# Patient Record
Sex: FEMALE
[De-identification: names, ages, dates, MRNs, and addresses within clinical notes are randomized per-mention and may not be internally consistent; named-entity substitution may affect disease eponyms.]

---

## 2017-09-17 ENCOUNTER — HOSPITAL ENCOUNTER (EMERGENCY)
Dept: HOSPITAL 25 - UCEAST | Age: 32
Discharge: HOME | End: 2017-09-17
Payer: COMMERCIAL

## 2017-09-17 VITALS — DIASTOLIC BLOOD PRESSURE: 85 MMHG | SYSTOLIC BLOOD PRESSURE: 109 MMHG

## 2017-09-17 DIAGNOSIS — O21.9: Primary | ICD-10-CM

## 2017-09-17 DIAGNOSIS — Z3A.01: ICD-10-CM

## 2017-09-17 PROCEDURE — 84702 CHORIONIC GONADOTROPIN TEST: CPT

## 2017-09-17 PROCEDURE — G0463 HOSPITAL OUTPT CLINIC VISIT: HCPCS

## 2017-09-17 PROCEDURE — 96360 HYDRATION IV INFUSION INIT: CPT

## 2017-09-17 PROCEDURE — 99202 OFFICE O/P NEW SF 15 MIN: CPT

## 2017-09-17 PROCEDURE — 81003 URINALYSIS AUTO W/O SCOPE: CPT

## 2017-09-17 NOTE — UC
Pregnancy





- HPI Summary


HPI Summary: 





31 y/o female  presents to the urgent care c/o persistent nausea and 

vomiting aince 9/10/2017. Pt states she is 7 weeks pregnant LMP 2017.  

Every day she has had 2 episode of vomiting. However yesterday she lost count. 

Today 3 episodes in the morning. She reports Hx of cyclic vomiting syndrome , 

Dx last years by VAUGHN RICCI who Rx Zolof and which helped with her syndrome. she has 

her first OBGYN appt on 10/5/2017.  She is not longer taking the Zolof since it 

was tapered down when she found out she was pregnant. She took 2 Zofrans this 

week, her PCP DR Gladys Nielsen told her it was ok to take it. she was able to 

eat some soup, and beef and drinking Pedialyte  yesterday  Pt denies abdominal 

pain, pelvic pain, vaginal discharge, vaginal bleeding,fever, SOB, chest pain, 

Diarrhea or constipation, urinary symptoms, dizziness. 








- History of Current Complaint


Chief Complaint: UCGI


Stated Complaint: VOMITING PREGNANT


Time Seen by Provider: 17 12:05


Hx Obtained From: Patient


Chief Complaint: Other: - Nausea and vomiting


Onset/Duration: Started Weeks Ago - 1 week ago, Still Present


Timing: Intermittent - 3 every day, exept yesterday where she lost count, 

Lasting Seconds, Lasting Days


Severity: Mild


Current Severity: Moderate


Pain Intensity: 0


Location of Pain: None


Aggravating Factors: Other: - environmental odors and smoking odors


Alleviating Factors: Nothing


Associated Signs and Symptoms: Positive: Appetite - decrease, Nausea, Vomiting





- Pregnancy Assessment


Hx Pregnant Now: Yes


Expected Date of Delivery: 18


Hx : 1


Hx Para: 0


History of Ectopic Pregnancy: No


Hx Pelvic Inflammatory Disease: No





- Risk Factors


Ectopic Pregnancy Risk Factor: Negative


Ovarian Torsion Risk Factor: Negative





- Allergies/Home Medications


Allergies/Adverse Reactions: 


 Allergies











Allergy/AdvReac Type Severity Reaction Status Date / Time


 


No Known Allergies Allergy   Verified 17 11:50











Home Medications: 


 Home Medications





Docosahexaenoic Acid [Prenatal Dha] 1 cap PO DAILY 17 [History Confirmed 

17]











PMH/Surg Hx/FS Hx/Imm Hx


Previously Healthy: Yes


Endocrine/Hematology History: 


   Denies: Hx Diabetes, Hx Thyroid Disease


Cardiovascular History: 


   Denies: Hx Hypertension


Respiratory History: 


   Denies: Hx Asthma, Hx Chronic Obstructive Pulmonary Disease (COPD)


GI History: Reports: Other GI Disorders - Cyclic vomitng syndrome


   Denies: Hx Ulcer


Infectious Disease History: No


Infectious Disease History: 


   Denies: Hx Clostridium Difficile, Hx Hepatitis, Hx Human Immunodeficiency 

Virus (HIV), Hx of Known/Suspected MRSA, Hx Shingles, Hx Tuberculosis, Hx Known/

Suspected VRE, Hx Known/Suspected VRSA, History Other Infectious Disease, 

Traveled Outside the [place] in Last 30 Days





- Family History


Known Family History: Positive: Cardiac Disease, Hypertension





- Social History


Occupation: Unemployed


Lives: With Family


Alcohol Use: None


Substance Use Type: Reports: None


Smoking Status (MU): Never Smoked Tobacco





Review of Systems


Constitutional: Negative


Skin: Negative


Eyes: Negative


ENT: Negative


Respiratory: Negative


Cardiovascular: Negative


Gastrointestinal: Vomiting, Nausea


Genitourinary: Negative


Motor: Negative


Neurovascular: Negative


Musculoskeletal: Negative


Neurological: Negative


Psychological: Negative


Is Patient Immunocompromised?: No


All Other Systems Reviewed And Are Negative: Yes





Physical Exam





- Physical  Exam


Triage Information Reviewed: Yes


Vital Signs Reviewed: Yes


Appearance: Positive: Well-Appearing, No Pain Distress, Well-Nourished


Skin: Positive: Warm, Skin Color Reflects Adequate Perfusion, Dry, Other - mild 

dry oral mucosa


Head/Face: Positive: Normal Head/Face Inspection


Eyes: Positive: Normal, EOMI, GLORIA, Conjunctiva Clear


ENT: Positive: Normal ENT inspection, Hearing grossly normal, Pharynx normal, 

TMs normal


Neck: Positive: Supple, Nontender, No Lymphadenopathy


Respiratory/Lung Sounds: Positive: Clear to Auscultation, Breath Sounds Present


Cardiovascular: Positive: Normal, RRR, Pulses are Symmetrical in both Upper and 

Lower Extremities, S1, S2


Abdomen Description: Positive: Nontender, No Organomegaly, Soft.  Negative: CVA 

Tenderness (R), CVA Tenderness (L)


Bowel Sounds: Positive: Present


Musculoskeletal: Positive: Normal, Strength/ROM Intact


Neurological: Positive: Normal, Sensory/Motor Intact, Alert, Oriented to Person 

Place, Time, CN Intact II-III, Reflexes Intact


Psychiatric: Positive: Normal





Pregnancy Course/Dx





- Course


Course Of Treatment: 31 y/o female  presents to the urgent care c/o 

persistent nausea and vomiting aince 9/10/2017. Pt states she is 7 weeks 

pregnant LMP 2017.  Every day she has had 2 episode of vomiting. However 

yesterday she lost count. Today 3 episodes in the morning. She reports Hx of 

cyclic vomiting syndrome , Dx last years by GI DR who Rx Zolof and which helped 

with her syndrome. she has her first OBGYN appt on 10/5/2017.  She is not 

longer taking the Zolof since it was tapered down when she found out she was 

pregnant. She took 2 Zofrans this week, her PCP DR Gladys Nielsen told her it was 

ok to take it. she was able to eat some soup, and beef and drinking Pedialyte  

yesterday  Pt denies abdominal pain, pelvic pain, vaginal discharge, vaginal 

bleeding,fever, SOB, chest pain, Diarrhea or constipation, urinary symptoms, 

dizziness. HX obtained.  UA: +ketones, +protein, +intact trace blood and 

pregnancy test: positive. PE WNL except pt with mild dehydration. IV fluid 

ordered , 1 bag. Pt tolerated well IV fluids and felt better. Pt Rx Diclgis for 

Nausea and vomiting and advised to stop taking Zofran. Pt educated on her diet 

to avoid vomiting triggers. Advised to f/u with OBGYN Dr Ahuja in 2-3 days 

for further management. Pt understood and agreed with plan of care and left the 

clinic ambulating and A&OX3





- Pregnancy


Differential Diagnosis/HQI/PQRI: Hyperemesis Gravidarum, Early Pregnancy, UTI, 

Vaginal Bleeding





- Diagnoses


Provider Diagnoses: 


 Nausea and vomiting during pregnancy prior to 22 weeks gestation, Pregnancy








Discharge





- Discharge Plan


Condition: Stable


Disposition: HOME


Prescriptions: 


Doxylamine/Pyridoxine(NF) [Diclegis (NF)] 1 tab PO QPM #28 tab MDD 4 tabs/day


Patient Education Materials:  Hyperemesis Gravidarum (ED)


Referrals: 


[facility] [Primary Care Provider] - 


Wanda Evans MD [Medical Doctor] - 1 Week


Additional Instructions: 


1- Please take the Diclegis as directed to alleviate symptoms of Nausea and 

Vomiting.


2- Please f/u with OBGYN Dr Evans for further management in your pregnancy


3- If you develop abdominal pain, pelvic pain or vaginal bleeding, please go 

immediately to the ER for further management.





Diet


Meals and snacks  Women with nausea should eat before, or as soon as, they 

feel hungry to avoid an empty stomach, which can aggravate nausea [1]. A snack 

before getting out of bed in the morning and snacks during the night (eg, 

crackers with peanut butter or cheese taken prior to bathroom trips) may be 

helpful.


Meals and snacks should be eaten slowly and in small amounts every one to two 

hours to avoid a full stomach, which can also aggravate nausea [2].


Women should determine what foods they tolerate best and try to eat those 

foods. Dietary manipulations that help some women include eliminating coffee 

and spicy, odorous, high-fat, acidic, and very sweet foods, and substituting 

snacks/meals that are protein-dominant, salty, low-fat, bland, and/or dry (eg, 

nuts, pretzels, crackers, cereal, toast) [1-3]. Drinking peppermint tea or 

sucking peppermint candies may reduce postprandial nausea [4]. 


However, high-quality evidence of the optimal dietary components to reduce 

nausea are sparse. Although clinicians commonly recommend ingestion of frequent

, small, carbohydrate-predominant meals/snacks, such as soda crackers or dry 

toast, based primarily on anecdotal evidence passed down over a century [5], 

consumption of protein-predominant meals/snacks may be more helpful and was 

associated with quantifiable decreases in nausea in one study [6].


Women whose symptoms are related to delayed gastric emptying should improve 

with a diet consisting of low-fat solids and liquids since these foods are more 

readily emptied by the stomach; however, it is not known to what degree gastric 

emptying and dysfunction account for symptoms in women with nausea and vomiting 

of pregnancy.


Fluids  Fluids should be consumed at least 30 minutes before or after solid 

food to minimize the effect of a full stomach [2]. Fluids are better tolerated 

if cold, clear, and carbonated or sour (eg, ginger ale, lemonade, popsicles) 

and taken in small amounts; using a straw sometimes helps [7]. Some women find 

aromatic liquids, such as lemon or mint tea, more tolerable. Small volumes of 

electrolyte-replacement sports drinks can be used to replace both fluids and 

electrolytes, if tolerated.


Avoidance of triggers  Along with dietary changes, avoidance of environmental 

triggers is a key intervention for reducing nausea and vomiting of pregnancy [3]

. Examples of some triggers include stuffy rooms, odors (eg, perfume, chemicals

, food, smoke) [8], heat, humidity, noise, and visual or physical motion (eg, 

flickering lights, driving) [9]. Quickly changing position and not getting 

enough rest, particularly after eating, may also aggravate symptoms [10]. Lying 

down soon after eating and lying on the left side are additional potentially 

aggravating factors because these actions may delay gastric emptying [2]. Cold 

solid foods are tolerated better than hot solid foods because they have less 

odor and require less preparation time (ie, shorter exposure to the trigger if 

the woman is preparing her own meal) [2]. Brushing teeth after a meal [4], 

spitting out saliva, and frequently washing out the mouth can also be helpful.


Supplements containing iron should be avoided until symptoms resolve, as iron 

causes gastric irritation and can provoke nausea and vomiting [11]. Taking 

prenatal vitamins before bed with a snack, instead of in the morning or on an 

empty stomach, may also be helpful [12].


Ginger  We suggest use of ginger-containing foods (eg, ginger lollipops, 

ginger tea, foods made with ginger root or syrup) for women with nausea. We do 

not prescribe powdered ginger because standard pharmacologic-grade ginger 

preparations are not readily available [13]; however, if prescribed, a common 

dose is 1 to 1.5 g orally divided over 24 hours (eg, 250 mg ginger capsules 

orally four times a day). In a 2014 systematic review and meta-analysis of 12 

randomized trials (n = 1278 pregnant women), ginger improved nausea compared 

with placebo but did not significantly reduce vomiting [14].

## 2018-05-03 ENCOUNTER — HOSPITAL ENCOUNTER (INPATIENT)
Dept: HOSPITAL 25 - MCHOBOUT | Age: 33
LOS: 2 days | Discharge: HOME | DRG: 560 | End: 2018-05-05
Attending: MIDWIFE | Admitting: MIDWIFE
Payer: COMMERCIAL

## 2018-05-03 DIAGNOSIS — O48.0: Primary | ICD-10-CM

## 2018-05-03 DIAGNOSIS — O92.13: ICD-10-CM

## 2018-05-03 DIAGNOSIS — Z3A.40: ICD-10-CM

## 2018-05-03 LAB
BASOPHILS # BLD AUTO: 0.1 10^3/UL (ref 0–0.2)
EOSINOPHIL # BLD AUTO: 0 10^3/UL (ref 0–0.6)
HCT VFR BLD AUTO: 40 % (ref 35–47)
HGB BLD-MCNC: 13.7 G/DL (ref 12–16)
LYMPHOCYTES # BLD AUTO: 2.1 10^3/UL (ref 1–4.8)
MCH RBC QN AUTO: 30 PG (ref 27–31)
MCHC RBC AUTO-ENTMCNC: 34 G/DL (ref 31–36)
MCV RBC AUTO: 88 FL (ref 80–97)
MONOCYTES # BLD AUTO: 0.6 10^3/UL (ref 0–0.8)
NEUTROPHILS # BLD AUTO: 6.9 10^3/UL (ref 1.5–7.7)
NRBC # BLD AUTO: 0 10^3/UL
NRBC BLD QL AUTO: 0.1
PLATELET # BLD AUTO: 167 10^3/UL (ref 150–450)
RBC # BLD AUTO: 4.57 10^6/UL (ref 4–5.4)
WBC # BLD AUTO: 9.6 10^3/UL (ref 3.5–10.8)

## 2018-05-03 PROCEDURE — 85025 COMPLETE CBC W/AUTO DIFF WBC: CPT

## 2018-05-03 PROCEDURE — 86850 RBC ANTIBODY SCREEN: CPT

## 2018-05-03 PROCEDURE — 36415 COLL VENOUS BLD VENIPUNCTURE: CPT

## 2018-05-03 PROCEDURE — 4A1HXCZ MONITORING OF PRODUCTS OF CONCEPTION, CARDIAC RATE, EXTERNAL APPROACH: ICD-10-PCS | Performed by: MIDWIFE

## 2018-05-03 PROCEDURE — 85027 COMPLETE CBC AUTOMATED: CPT

## 2018-05-03 PROCEDURE — 10907ZC DRAINAGE OF AMNIOTIC FLUID, THERAPEUTIC FROM PRODUCTS OF CONCEPTION, VIA NATURAL OR ARTIFICIAL OPENING: ICD-10-PCS | Performed by: MIDWIFE

## 2018-05-03 PROCEDURE — 86901 BLOOD TYPING SEROLOGIC RH(D): CPT

## 2018-05-03 PROCEDURE — 3E033VJ INTRODUCTION OF OTHER HORMONE INTO PERIPHERAL VEIN, PERCUTANEOUS APPROACH: ICD-10-PCS | Performed by: MIDWIFE

## 2018-05-03 PROCEDURE — 86900 BLOOD TYPING SEROLOGIC ABO: CPT

## 2018-05-03 NOTE — PN
L&D Outpatient: Visit





- Reproductive Information


Estimated Due Date: 18


Gestational Age: 40 Weeks and 0 Days


: 1





- Reason for Visit


Visit Reason: 





Patient arrived to L & D in early/prodromal labor. Minimal sleep overnight, 

aldair every 6-12 minutes. Notes strength varies with contractions. No 

vaginal bleeding or fluid loss. 





- Antepartal Records


Antepartal Record: Reviewed, Pregnancy Complicated by: - Gestational diabetes





- Patient History


Patient History Significant For: 





Hx of depression/anxiety; weaned off of sertraline at start of pregnancy





L&D Outpatient: ROS





- Review of Systems


Constitutional: Uncomfortable - Mild discomfort with contractions


CV Complaint: No


Respiratory: Shortness of Breath: No


Gastrointestinal: No Nausea/Vomiting, Normal Bowel Movement


Genitourinary: No Dysuria, No Bleeding, No Leaking Fluid


Musculoskeletal: Contractions


Fetal Movement: Normal





L&D Outpatient: Exam


Vitals - Most Recent: 





T 97.4 /85 P 100 R 18





- Cervical Exam


Cervical Exam: 





2-3/90/0





- Abdominal Exam


Abdomen Exam: Non-Tender, Fundal Height Consistent with Dates





- Membranes


Membrane Status: Intact





- Ultrasound/Biophysical Profile


Ultrasound Status: Not Done





L&D Outpatient: EFM





- External Fetal Monitor Findings


Baseline Fetal Heart Rate: 140


External Fetal Monitor Findings: Accelerations Present, No Pattern of Variable 

or Late Decelerations, Variability Moderate, Baseline Stable





L&D Outpatient: Asses/Plan


Assessment: 





31 yo G1 at 40 0/7 IUP, CAt I FHRT, GBS negative, early labor


Plan: Follow Up: - Observe on labor and delivery, therapeutic rest

## 2018-05-04 PROCEDURE — 0KQM0ZZ REPAIR PERINEUM MUSCLE, OPEN APPROACH: ICD-10-PCS | Performed by: MIDWIFE

## 2018-05-04 RX ADMIN — IBUPROFEN PRN MG: 600 TABLET, FILM COATED ORAL at 16:28

## 2018-05-04 RX ADMIN — DOCUSATE SODIUM SCH MG: 100 CAPSULE, LIQUID FILLED ORAL at 16:29

## 2018-05-04 RX ADMIN — DOCUSATE SODIUM SCH MG: 100 CAPSULE, LIQUID FILLED ORAL at 21:30

## 2018-05-05 VITALS — DIASTOLIC BLOOD PRESSURE: 65 MMHG | SYSTOLIC BLOOD PRESSURE: 102 MMHG

## 2018-05-05 LAB
HCT VFR BLD AUTO: 31 % (ref 35–47)
HGB BLD-MCNC: 10.9 G/DL (ref 12–16)
MCH RBC QN AUTO: 31 PG (ref 27–31)
MCHC RBC AUTO-ENTMCNC: 35 G/DL (ref 31–36)
MCV RBC AUTO: 88 FL (ref 80–97)
PLATELET # BLD AUTO: 146 10^3/UL (ref 150–450)
RBC # BLD AUTO: 3.59 10^6/UL (ref 4–5.4)
WBC # BLD AUTO: 14.3 10^3/UL (ref 3.5–10.8)

## 2018-05-05 RX ADMIN — DOCUSATE SODIUM SCH MG: 100 CAPSULE, LIQUID FILLED ORAL at 08:01

## 2018-05-05 RX ADMIN — IBUPROFEN PRN MG: 600 TABLET, FILM COATED ORAL at 14:09

## 2018-05-05 RX ADMIN — IBUPROFEN PRN MG: 600 TABLET, FILM COATED ORAL at 08:01

## 2018-05-05 RX ADMIN — DOCUSATE SODIUM SCH MG: 100 CAPSULE, LIQUID FILLED ORAL at 14:08
